# Patient Record
Sex: FEMALE | Race: AMERICAN INDIAN OR ALASKA NATIVE
[De-identification: names, ages, dates, MRNs, and addresses within clinical notes are randomized per-mention and may not be internally consistent; named-entity substitution may affect disease eponyms.]

---

## 2019-06-19 ENCOUNTER — HOSPITAL ENCOUNTER (EMERGENCY)
Dept: HOSPITAL 5 - ED | Age: 54
Discharge: HOME | End: 2019-06-19
Payer: SELF-PAY

## 2019-06-19 VITALS — SYSTOLIC BLOOD PRESSURE: 110 MMHG | DIASTOLIC BLOOD PRESSURE: 68 MMHG

## 2019-06-19 DIAGNOSIS — G89.29: ICD-10-CM

## 2019-06-19 DIAGNOSIS — M79.601: Primary | ICD-10-CM

## 2019-06-19 PROCEDURE — 99282 EMERGENCY DEPT VISIT SF MDM: CPT

## 2019-06-19 NOTE — EMERGENCY DEPARTMENT REPORT
Chief Complaint: Extremity Injury, Upper


Stated Complaint: RT HAND PAIN


Time Seen by Provider: 06/19/19 11:34





- HPI


History of Present Illness: 





This is a 53-year-old female nontoxic well in appearance with no signs of 

distress presents to the ED for Tramadol prescription.  Patient stated has 

carpal tunnel syndrome and burn to right arm for 40 years.  Denies any new 

symptoms.  Patient denies any new trauma.  Stated has a orthopedic that she 

follows.  Stated has only 2 tramadol medications left and is requesting for 

more.  Denies any fever, chills, headache, nausea, vomiting, chest pain or SOB. 

Denies any other complaints.  





- Exam


Vital Signs: 


                                   Vital Signs











  06/19/19





  11:31


 


Temperature 97.8 F


 


Pulse Rate 62


 


Respiratory 18





Rate 


 


Blood Pressure 110/68


 


O2 Sat by Pulse 100





Oximetry 











Physical Exam: 





Right hand within normal ROM.  No cellulitis.  No trauma noted.  Normal 

inspection and exam noted to right arm.


MSE screening note: 


Focused history and physical exam performed.


Due to findings the following was ordered:











ED Medical Decision Making





- Medical Decision Making





I will prescribed Naproxen due to it being controlled substance and patient has 

2 more pills left.  Patient will need follow-up with pain management for chronic

pain.  Patient was instructed to Follow-up with a orthopedic doctor in 3-5 days 

or if symptoms worsen and continue return to emergency room as soon as possible.

 At time of discharge, the patient does not seem toxic or ill in appearance.  No

acute signs of distress noted.  Patient agrees to discharge treatment plan of 

care.  No further questions noted by the patient.











ED Disposition for MSE


Clinical Impression: 


Chronic arm pain


Qualifiers:


 Laterality: right Qualified Code(s): M79.601 - Pain in right arm; G89.29 - 

Other chronic pain





Disposition: DC-01 TO HOME OR SELFCARE


Is pt being admited?: No


Does the pt Need Aspirin: No


Condition: Stable


Additional Instructions: 


Follow-up with a orthopedic doctor in 3-5 days or if symptoms worsen and 

continue return to emergency room as soon as possible.  


Prescriptions: 


Naproxen [Naprosyn TAB] 500 mg PO BID PRN #12 tablet


 PRN Reason: Pain , Severe (7-10)


[facility]: 


[facility] - [facility]-5 Days


[facility],MD [Referring] - 3-5 Days


BETITO SOSA MD [Staff Physician] - 3-5 Days